# Patient Record
Sex: MALE | NOT HISPANIC OR LATINO | URBAN - METROPOLITAN AREA
[De-identification: names, ages, dates, MRNs, and addresses within clinical notes are randomized per-mention and may not be internally consistent; named-entity substitution may affect disease eponyms.]

---

## 2021-02-12 ENCOUNTER — HOSPITAL ENCOUNTER (OUTPATIENT)
Dept: OTHER | Facility: HOSPITAL | Age: 41
Discharge: HOME | End: 2021-02-12
Attending: NURSE PRACTITIONER

## 2022-04-07 ENCOUNTER — TRANSCRIBE ORDERS (OUTPATIENT)
Dept: ADMINISTRATIVE | Facility: HOSPITAL | Age: 42
End: 2022-04-07

## 2022-04-07 DIAGNOSIS — R14.0 ABDOMINAL BLOATING: Primary | ICD-10-CM

## 2022-04-26 ENCOUNTER — HOSPITAL ENCOUNTER (OUTPATIENT)
Dept: NUCLEAR MEDICINE | Facility: HOSPITAL | Age: 42
Discharge: HOME | End: 2022-04-26

## 2022-04-26 DIAGNOSIS — R14.0 ABDOMINAL BLOATING: ICD-10-CM

## 2022-04-26 PROCEDURE — 0 TECHNETIUM TC 99M MEBROFENIN KIT: Performed by: NURSE PRACTITIONER

## 2022-04-26 PROCEDURE — A9537 TC99M MEBROFENIN: HCPCS | Performed by: NURSE PRACTITIONER

## 2022-04-26 PROCEDURE — 78227 HEPATOBIL SYST IMAGE W/DRUG: CPT

## 2022-04-26 RX ORDER — KIT FOR THE PREPARATION OF TECHNETIUM TC 99M MEBROFENIN 45 MG/10ML
1 INJECTION, POWDER, LYOPHILIZED, FOR SOLUTION INTRAVENOUS
Status: COMPLETED | OUTPATIENT
Start: 2022-04-26 | End: 2022-04-26

## 2022-04-26 RX ADMIN — KIT FOR THE PREPARATION OF TECHNETIUM TC 99M MEBROFENIN 1 DOSE: 45 INJECTION, POWDER, LYOPHILIZED, FOR SOLUTION INTRAVENOUS at 12:25

## 2022-06-12 ENCOUNTER — HOSPITAL ENCOUNTER (EMERGENCY)
Dept: HOSPITAL 49 - FER | Age: 42
Discharge: HOME | End: 2022-06-12
Payer: COMMERCIAL

## 2022-06-12 DIAGNOSIS — R55: Primary | ICD-10-CM

## 2022-06-12 DIAGNOSIS — Z28.310: ICD-10-CM

## 2022-06-12 DIAGNOSIS — Z20.822: ICD-10-CM

## 2022-06-12 DIAGNOSIS — I10: ICD-10-CM

## 2022-06-12 DIAGNOSIS — Z88.6: ICD-10-CM

## 2022-06-12 DIAGNOSIS — R56.9: ICD-10-CM

## 2022-06-12 LAB
ALBUMIN SERPL-MCNC: 3.8 G/DL (ref 3.4–5)
ALKALINE PHOSHATASE: 116 U/L (ref 46–116)
ALT SERPL-CCNC: 181 U/L (ref 16–63)
AMPHETAMINES: NEGATIVE
AST: 77 U/L (ref 15–37)
BARBITURATES: NEGATIVE
BASOPHIL: 0.2 % (ref 0–2)
BILIRUBIN - TOTAL: 0.5 MG/DL (ref 0.2–1)
BILIRUBIN: NEGATIVE MG/DL
BLOOD: NEGATIVE ERY/UL
BUN SERPL-MCNC: 18 MG/DL (ref 7–18)
BUN/CREAT RATIO (CALC): 15 RATIO
CHLORIDE: 101 MMOL/L (ref 98–107)
CLARITY UR: CLEAR
CO2 (BICARBONATE): 23 MMOL/L (ref 21–32)
COLOR: YELLOW
CREATININE: 1.2 MG/DL (ref 0.67–1.17)
ECSTASY (MDMA): NEGATIVE
EOSINOPHIL: 0.7 % (ref 0–5)
GLOBULIN (CALCULATION): 4.6 G/DL
GLUCOSE (U): NORMAL MG/DL
GLUCOSE SERPL-MCNC: 82 MG/DL (ref 74–106)
HCT: 41.6 % (ref 42–52)
HGB BLD-MCNC: 14.1 G/DL (ref 13.2–18)
LACTIC ACID: 2.2 MMOL/L (ref 0.4–1.9)
LEUKOCYTES: NEGATIVE LEU/UL
LYMPHOCYTE: 29.8 % (ref 15–48)
MAGNESIUM SERPL-MCNC: 2 MG/DL (ref 1.8–2.4)
MARIJUANA (THC): NEGATIVE
MCH RBC QN AUTO: 31.3 PG (ref 25–31)
MCHC RBC AUTO-ENTMCNC: 33.9 G/DL (ref 32–36)
MCV: 92.4 FL (ref 78–100)
METHADONE: NEGATIVE
MONOCYTE: 10.5 % (ref 0–12)
MPV: 9.8 FL (ref 6–9.5)
NEUTROPHIL: 58.1 % (ref 41–80)
NITRITE: NEGATIVE MG/DL
NRBC: 0
OPIATES: NEGATIVE
OXYCODONE: NEGATIVE
PLT: 182 K/UL (ref 150–400)
POTASSIUM: 3.4 MMOL/L (ref 3.5–5.1)
PROTEIN: NEGATIVE MG/DL
RBC MORPHOLOGY: NORMAL
RBC: 4.5 M/UL (ref 4.7–6)
RDW: 12.6 % (ref 11.5–14)
SPECIFIC GRAVITY: <=1.005 (ref 1–1.03)
TOTAL PROTEIN: 8.4 G/DL (ref 6.4–8.2)
UROBILINOGEN: 0.2 MG/DL (ref 0.2–1)
WBC: 4.5 K/UL (ref 4–10.5)

## 2022-06-12 PROCEDURE — G0480 DRUG TEST DEF 1-7 CLASSES: HCPCS

## 2022-06-12 PROCEDURE — U0002 COVID-19 LAB TEST NON-CDC: HCPCS

## 2022-08-24 ENCOUNTER — OFFICE VISIT (OUTPATIENT)
Dept: CARDIOLOGY | Facility: CLINIC | Age: 42
End: 2022-08-24

## 2022-08-24 VITALS
SYSTOLIC BLOOD PRESSURE: 128 MMHG | DIASTOLIC BLOOD PRESSURE: 82 MMHG | HEIGHT: 70 IN | HEART RATE: 65 BPM | WEIGHT: 277 LBS | BODY MASS INDEX: 39.65 KG/M2

## 2022-08-24 DIAGNOSIS — R55 SYNCOPE AND COLLAPSE: Primary | ICD-10-CM

## 2022-08-24 PROCEDURE — 99204 OFFICE O/P NEW MOD 45 MIN: CPT | Performed by: INTERNAL MEDICINE

## 2022-08-24 PROCEDURE — 93000 ELECTROCARDIOGRAM COMPLETE: CPT | Performed by: INTERNAL MEDICINE

## 2022-08-24 RX ORDER — NEBIVOLOL 20 MG/1
30 TABLET ORAL DAILY
COMMUNITY
Start: 2022-07-27

## 2022-08-24 RX ORDER — CETIRIZINE HYDROCHLORIDE 10 MG/1
10 TABLET ORAL DAILY
COMMUNITY

## 2022-08-24 RX ORDER — OMEPRAZOLE 40 MG/1
40 CAPSULE, DELAYED RELEASE ORAL DAILY
COMMUNITY
Start: 2022-07-27

## 2022-08-24 RX ORDER — ERGOCALCIFEROL (VITAMIN D2) 10 MCG
400 TABLET ORAL DAILY
COMMUNITY

## 2022-08-24 RX ORDER — ALLOPURINOL 300 MG/1
300 TABLET ORAL
COMMUNITY
Start: 2022-07-27

## 2022-08-24 RX ORDER — AMLODIPINE BESYLATE 10 MG/1
10 TABLET ORAL DAILY
COMMUNITY
Start: 2022-07-27

## 2022-08-24 RX ORDER — DOXAZOSIN 8 MG/1
8 TABLET ORAL DAILY
COMMUNITY
Start: 2022-07-27

## 2022-08-24 NOTE — PROGRESS NOTES
Jordy Leos  1980  Date of Office Visit: 08/24/22  Encounter Provider: Roel Goodman MD  Place of Service: [facility]IOLOG[facility]      CHIEF COMPLAINT:  Syncope  Essential hypertension      HISTORY OF PRESENT ILLNESS:  I had the pleasure of seeing Jordy Leos in consultation today.  He is a 42-year-old male with a medical history of essential hypertension and obesity presents to me with a syncopal episode.  He states that he was outside working in his driveway washing his wife's car 90 degree weather and was very hot.  He did not drink much water that day.  He denies any tachycardia or chest pain prior to his episode.  He went into the garage and had a few beers with his friend.  He subsequently stood up and stated that he lost consciousness falling and regaining consciousness prior to landing.  He stated that he hit his head on the ground and had some bleeding.  He denied any postictal state.  No loss of bowel or bladder control.  He had no tachycardia or chest pain after.  He has had no recurrent episodes.    Review of Systems   Constitutional: Negative for fever and malaise/fatigue.   HENT: Negative for nosebleeds and sore throat.    Eyes: Negative for blurred vision and double vision.   Cardiovascular: Positive for syncope. Negative for chest pain, claudication and palpitations.   Respiratory: Negative for cough, shortness of breath and snoring.    Endocrine: Negative for cold intolerance, heat intolerance and polydipsia.   Skin: Negative for itching, poor wound healing and rash.   Musculoskeletal: Negative for joint pain, joint swelling, muscle weakness and myalgias.   Gastrointestinal: Negative for abdominal pain, melena, nausea and vomiting.   Neurological: Negative for light-headedness, loss of balance, seizures, vertigo and weakness.   Psychiatric/Behavioral: Negative for altered mental status and depression.       Past Medical History:   Diagnosis Date  "  • GERD (gastroesophageal reflux disease)    • Gout    • Hypertension        The following portions of the patient's history were reviewed and updated as appropriate: Social history , Family history and Surgical history     Current Outpatient Medications on File Prior to Visit   Medication Sig Dispense Refill   • allopurinol (ZYLOPRIM) 300 MG tablet Take 300 mg by mouth every night at bedtime.     • amLODIPine (NORVASC) 10 MG tablet Take 10 mg by mouth Daily.     • cetirizine (zyrTEC) 10 MG tablet Take 10 mg by mouth Daily.     • doxazosin (CARDURA) 8 MG tablet Take 8 mg by mouth Daily. as directed     • KRILL OIL PO Take 1 capsule by mouth Daily.     • nebivolol (BYSTOLIC) 20 MG tablet Take 30 mg by mouth Daily.     • omeprazole (priLOSEC) 40 MG capsule Take 40 mg by mouth Daily.     • Probiotic Product ([place] PO) Take 1 capsule by mouth Daily.     • Vitamin D, Cholecalciferol, (CHOLECALCIFEROL) 10 MCG (400 UNIT) tablet Take 400 Units by mouth Daily.       No current facility-administered medications on file prior to visit.       Allergies   Allergen Reactions   • Lisinopril Hives and Swelling   • Aspirin Unknown - Low Severity       Vitals:    08/24/22 1156   BP: 128/82   Pulse: 65   Weight: 126 kg (277 lb)   Height: 177.8 cm (70\")     Body mass index is 39.75 kg/m².   Constitutional:       Appearance: Well-developed.   Eyes:      General: No scleral icterus.     Conjunctiva/sclera: Conjunctivae normal.   HENT:      Head: Normocephalic and atraumatic.   Neck:      Thyroid: No thyromegaly.      Vascular: Normal carotid pulses. No carotid bruit, hepatojugular reflux or JVD.      Trachea: No tracheal deviation.   Pulmonary:      Effort: No respiratory distress.      Breath sounds: Normal breath sounds. No decreased breath sounds. No wheezing. No rhonchi. No rales.   Chest:      Chest wall: Not tender to palpatation.   Cardiovascular:      Normal rate. Regular rhythm.      No gallop.   Pulses:     " Carotid: 2+ bilaterally.     Radial: 2+ bilaterally.     Femoral: 2+ bilaterally.     Dorsalis pedis: 2+ bilaterally.     Posterior tibial: 2+ bilaterally.  Edema:     Peripheral edema absent.   Abdominal:      General: Bowel sounds are normal. There is no distension.      Palpations: Abdomen is soft.      Tenderness: There is no abdominal tenderness.   Musculoskeletal:         General: No deformity.      Cervical back: Normal range of motion and neck supple. Skin:     Findings: No erythema or rash.   Neurological:      Mental Status: Alert and oriented to person, place, and time.      Sensory: No sensory deficit.   Psychiatric:         Behavior: Behavior normal.             ECG 12 Lead    Date/Time: 8/24/2022 12:18 PM  Performed by: Roel Goodman MD  Authorized by: Roel Goodman MD   Comparison: not compared with previous ECG   Previous ECG: no previous ECG available  Rhythm: sinus rhythm  Rate: normal    Clinical impression: non-specific ECG  Comments: Nonspecific T wave abnormalities anterior leads                 DISCUSSION/SUMMARY  42-year-old male with medical history of obesity, essential hypertension, gastroesophageal reflux disease presented to me with a syncopal episode after working outside in his driveway and extreme heat.  He stated that he was not drinking water and that the garage was also hot that he was sitting in.  He had a few beers and lost consciousness.  It sounds like this was a combination of the heat and volume depletion plus minus alcohol.  He has had no recurrent episodes.  He had no chest pain or tachycardia prior to or after.  His cardiac exam is normal.    1.  Syncope: Likely combination of volume depletion and heat.  - Transthoracic echocardiogram has been recommended and he is agreeable.    2.  Essential hypertension: Well-controlled.  Continue amlodipine therapy along with nebivolol.  No changes indicated.    No ischemic work-up indicated.

## 2022-08-26 ENCOUNTER — HOSPITAL ENCOUNTER (OUTPATIENT)
Dept: HOSPITAL 49 - FAS | Age: 42
Discharge: HOME | End: 2022-08-26
Attending: STUDENT IN AN ORGANIZED HEALTH CARE EDUCATION/TRAINING PROGRAM
Payer: COMMERCIAL

## 2022-08-26 VITALS — WEIGHT: 282.24 LBS | BODY MASS INDEX: 38.23 KG/M2 | HEIGHT: 72 IN

## 2022-08-26 DIAGNOSIS — I10: ICD-10-CM

## 2022-08-26 DIAGNOSIS — K64.8: ICD-10-CM

## 2022-08-26 DIAGNOSIS — K21.9: ICD-10-CM

## 2022-08-26 DIAGNOSIS — Z88.8: ICD-10-CM

## 2022-08-26 DIAGNOSIS — D12.2: Primary | ICD-10-CM

## 2022-08-26 DIAGNOSIS — D12.3: ICD-10-CM

## 2022-08-26 DIAGNOSIS — Z87.891: ICD-10-CM

## 2022-08-26 DIAGNOSIS — Z79.899: ICD-10-CM

## 2023-10-20 ENCOUNTER — OFFICE VISIT (OUTPATIENT)
Dept: ORTHOPEDIC SURGERY | Facility: CLINIC | Age: 43
End: 2023-10-20
Payer: COMMERCIAL

## 2023-10-20 VITALS — WEIGHT: 291 LBS | HEIGHT: 70 IN | BODY MASS INDEX: 41.66 KG/M2

## 2023-10-20 DIAGNOSIS — M75.52 BURSITIS OF LEFT SHOULDER: ICD-10-CM

## 2023-10-20 DIAGNOSIS — M25.512 LEFT SHOULDER PAIN, UNSPECIFIED CHRONICITY: Primary | ICD-10-CM

## 2023-10-20 PROCEDURE — 99203 OFFICE O/P NEW LOW 30 MIN: CPT | Performed by: ORTHOPAEDIC SURGERY

## 2023-10-20 PROCEDURE — 20610 DRAIN/INJ JOINT/BURSA W/O US: CPT | Performed by: ORTHOPAEDIC SURGERY

## 2023-10-20 RX ORDER — DICLOFENAC SODIUM 75 MG/1
75 TABLET, DELAYED RELEASE ORAL 2 TIMES DAILY
Qty: 60 TABLET | Refills: 0 | Status: SHIPPED | OUTPATIENT
Start: 2023-10-20

## 2023-10-20 RX ADMIN — TRIAMCINOLONE ACETONIDE 40 MG: 40 INJECTION, SUSPENSION INTRA-ARTICULAR; INTRAMUSCULAR at 16:12

## 2023-10-20 RX ADMIN — LIDOCAINE HYDROCHLORIDE 5 ML: 10 INJECTION, SOLUTION INFILTRATION; PERINEURAL at 16:12

## 2023-10-20 NOTE — PROGRESS NOTES
"Chief Complaint  Initial Evaluation of the Left Shoulder     Mike Leos presents to [facility] for initial evaluation of the left shoulder. He has pain over the AC joint.  He has had pain for 3-4 weeks.  He has had no recent injury or fall. He has been working on cutting a tree down in the yard recently.  He had a IM in and anti inflammatory that helped some.      Allergies   Allergen Reactions    Lisinopril Hives and Swelling    Aspirin Unknown - Low Severity        Social History     Socioeconomic History    Marital status: Single   Tobacco Use    Smoking status: Former     Packs/day: 0.25     Years: 1.00     Additional pack years: 0.00     Total pack years: 0.25     Types: Cigarettes    Smokeless tobacco: Never    Tobacco comments:     Hx of social use   Vaping Use    Vaping Use: Unknown   Substance and Sexual Activity    Alcohol use: Yes     Alcohol/week: 7.0 - 21.0 standard drinks of alcohol     Types: 7 - 21 Cans of beer per week     Comment: 1-3 day    Drug use: Never    Sexual activity: Defer        I reviewed the patient's chief complaint, history of present illness, review of systems, past medical history, surgical history, family history, social history, medications, and allergy list.     Review of Systems     Constitutional: Denies fevers, chills, weight loss  Cardiovascular: Denies chest pain, shortness of breath  Skin: Denies rashes, acute skin changes  Neurologic: Denies headache, loss of consciousness        Vital Signs:   Ht 177.8 cm (70\")   Wt 132 kg (291 lb)   BMI 41.75 kg/m²          Physical Exam  General: Alert. No acute distress    Ortho Exam        LEFT SHOULDER Forward flexion 180. Abduction 180. External rotation 60. Internal rotation L5. Negative Cross body adduction. Supraspinatus strength 5/5. Infraspinatus Strength 5/5. Infrared subscap 5/5. Negative Jackson. Negative Neer. Negative Apprehension. Negative Lift off. (Negative Obriens. " Sensation intact to light touch, median, radial, ulnar nerve. Positive AIN, PIN, ulnar nerve motor. Positive pulses. Negative Impingement signs. Good strength in triceps, biceps, deltoid, wrist extensors and wrist flexors.        Large Joint Arthrocentesis: L subacromial bursa  Date/Time: 10/20/2023 4:12 PM  Consent given by: patient  Site marked: site marked  Timeout: Immediately prior to procedure a time out was called to verify the correct patient, procedure, equipment, support staff and site/side marked as required   Supporting Documentation  Indications: pain   Procedure Details  Location: shoulder - L subacromial bursa  Preparation: Patient was prepped and draped in the usual sterile fashion  Needle size: 22 G  Medications administered: 5 mL lidocaine 1 %; 40 mg triamcinolone acetonide 40 MG/ML  Patient tolerance: patient tolerated the procedure well with no immediate complications          Imaging Results (Most Recent)       Procedure Component Value Units Date/Time    XR Scapula Left [[id number]] Resulted: 10/20/23 1553     Updated: 10/20/23 1555             Result Review :     X-Ray Report:  Left scapula X-Ray  Indication: Evaluation of the left scapula   AP/Lateral and [place] view(s)  Findings: No fracture or dislocation. Mild to moderate AC joint arthritis.   Prior studies available for comparison: no        Assessment and Plan     Diagnoses and all orders for this visit:    1. Left shoulder pain, unspecified chronicity (Primary)  -     XR Scapula Left    2. Bursitis of left shoulder        Discussed the treatment plan with the patient.     Discussed the risks and benefits of conservative measures. The patient expressed understanding and wished to proceed with a left shoulder steroid injection. He tolerated the injection well.       Call or return if worsening symptoms.    Follow Up     PRN      Patient was given instructions and counseling regarding his condition or for health maintenance advice. Please  see specific information pulled into the AVS if appropriate.     Scribed for Reginald Bernal MD by Pauline Maravilla MA.  10/20/23   15:54 EDT      I have personally performed the services described in this document as scribed by the above individual and it is both accurate and complete. Reginald Bernal MD 10/24/23

## 2023-10-24 RX ORDER — LIDOCAINE HYDROCHLORIDE 10 MG/ML
5 INJECTION, SOLUTION INFILTRATION; PERINEURAL
Status: COMPLETED | OUTPATIENT
Start: 2023-10-20 | End: 2023-10-20

## 2023-10-24 RX ORDER — TRIAMCINOLONE ACETONIDE 40 MG/ML
40 INJECTION, SUSPENSION INTRA-ARTICULAR; INTRAMUSCULAR
Status: COMPLETED | OUTPATIENT
Start: 2023-10-20 | End: 2023-10-20

## 2023-12-27 DIAGNOSIS — M25.512 LEFT SHOULDER PAIN, UNSPECIFIED CHRONICITY: ICD-10-CM

## 2023-12-27 DIAGNOSIS — M75.52 BURSITIS OF LEFT SHOULDER: ICD-10-CM

## 2023-12-27 RX ORDER — DICLOFENAC SODIUM 75 MG/1
75 TABLET, DELAYED RELEASE ORAL 2 TIMES DAILY
Qty: 60 TABLET | Refills: 0 | Status: SHIPPED | OUTPATIENT
Start: 2023-12-27

## 2024-01-29 ENCOUNTER — OFFICE VISIT (OUTPATIENT)
Dept: ORTHOPEDIC SURGERY | Facility: CLINIC | Age: 44
End: 2024-01-29
Payer: COMMERCIAL

## 2024-01-29 VITALS
DIASTOLIC BLOOD PRESSURE: 91 MMHG | WEIGHT: 291 LBS | OXYGEN SATURATION: 97 % | HEART RATE: 68 BPM | HEIGHT: 70 IN | SYSTOLIC BLOOD PRESSURE: 143 MMHG | BODY MASS INDEX: 41.66 KG/M2

## 2024-01-29 DIAGNOSIS — M25.512 LEFT SHOULDER PAIN, UNSPECIFIED CHRONICITY: ICD-10-CM

## 2024-01-29 DIAGNOSIS — M75.52 BURSITIS OF LEFT SHOULDER: ICD-10-CM

## 2024-01-29 RX ORDER — DICLOFENAC SODIUM 75 MG/1
75 TABLET, DELAYED RELEASE ORAL 2 TIMES DAILY
Qty: 60 TABLET | Refills: 2 | Status: SHIPPED | OUTPATIENT
Start: 2024-01-29

## 2024-01-29 RX ADMIN — TRIAMCINOLONE ACETONIDE 40 MG: 40 INJECTION, SUSPENSION INTRA-ARTICULAR; INTRAMUSCULAR at 15:39

## 2024-01-29 RX ADMIN — LIDOCAINE HYDROCHLORIDE 5 ML: 10 INJECTION, SOLUTION INFILTRATION; PERINEURAL at 15:39

## 2024-01-29 NOTE — LETTER
February 4, 2024     Patient: Jordy Leos   YOB: 1980   Date of Visit: 1/29/2024       To Whom it May Concern:    Jordy Leos was seen in my clinic on 1/29/2024. He {Return to school/sport:68875}.    If you have any questions or concerns, please don't hesitate to call.         Sincerely,          MYLES Anderson        CC:   No Recipients

## 2024-01-29 NOTE — PROGRESS NOTES
"Chief Complaint  Follow-up of the Left Shoulder    Subjective      Jordy Leos presents to [facility] for follow-up of left shoulder pain.  Patient was last seen on 10/20/2023 where he received an injection into the shoulder.  Reports that the shot helped with his pain up until approximately a week ago.  He denies any new injury.  He continues to have pain with overhead motions and abduction.  Reports that he also has a pulling sensation in the neck with certain motions.  He is here today to receive another injection.    Objective   Allergies   Allergen Reactions    Lisinopril Hives and Swelling    Aspirin Unknown - Low Severity       Vital Signs:   /91   Pulse 68   Ht 177.8 cm (70\")   Wt 132 kg (291 lb)   SpO2 97%   BMI 41.75 kg/m²     Please follow-up with PCP regarding blood pressure.    Physical Exam    Constitutional: Awake, alert. Well nourished appearance.    Integumentary: Warm, dry, intact. No obvious rashes.    HENT: Atraumatic, normocephalic.   Respiratory: Non labored respirations .   Cardiovascular: Intact peripheral pulses.    Psychiatric: Normal mood and affect. A&O X3    Ortho Exam  Left shoulder: Active range of motion for shoulder flexion 150 degrees, abduction 90, internal rotation back pocket, external rotation 80 degrees.  Full range of motion of the elbow and the wrist.  Neurovascular intact.  Sensation is intact.    Imaging Results (Most Recent)       None             Large Joint LEFT SHOULDER  Date/Time: 1/29/2024 3:39 PM  Consent given by: patient  Site marked: site marked  Timeout: Immediately prior to procedure a time out was called to verify the correct patient, procedure, equipment, support staff and site/side marked as required   Supporting Documentation  Indications: pain   Procedure Details  Location: shoulder -   Preparation: Patient was prepped and draped in the usual sterile fashion  Needle gauge: 21G.  Medications administered: 5 mL " lidocaine 1 %; 40 mg triamcinolone acetonide 40 MG/ML  Patient tolerance: patient tolerated the procedure well with no immediate complications              Assessment and Plan   Problem List Items Addressed This Visit    None  Visit Diagnoses       Left shoulder pain, unspecified chronicity        Relevant Medications    diclofenac (VOLTAREN) 75 MG EC tablet    Other Relevant Orders    Large Joint LEFT SHOULDER    Bursitis of left shoulder        Relevant Medications    diclofenac (VOLTAREN) 75 MG EC tablet            Follow Up   Return in about 3 months (around 4/29/2024).    Patient is a non-smoker, did not discuss options for smoking cessation.     Social History     Socioeconomic History    Marital status: Single   Tobacco Use    Smoking status: Former     Packs/day: 0.25     Years: 1.00     Additional pack years: 0.00     Total pack years: 0.25     Types: Cigarettes    Smokeless tobacco: Never    Tobacco comments:     Hx of social use   Vaping Use    Vaping Use: Unknown   Substance and Sexual Activity    Alcohol use: Yes     Alcohol/week: 7.0 - 21.0 standard drinks of alcohol     Types: 7 - 21 Cans of beer per week     Comment: 1-3 day    Drug use: Never    Sexual activity: Defer       Patient Instructions   Left shoulder steroid injection administered in office today and tolerated the procedure well without complications.  Patient advised on 3 months duration between injections.  Discussed that if patient is diabetic to closely monitor blood glucose levels over the next 24 to 48 hours.    Order sent for diclofenac.    Follow-up in 3 months for repeat injection if needed.  Call with questions, concerns or worsening symptoms.    Patient was given instructions and counseling regarding his condition or for health maintenance advice. Please see specific information pulled into the AVS if appropriate.

## 2024-02-04 RX ORDER — TRIAMCINOLONE ACETONIDE 40 MG/ML
40 INJECTION, SUSPENSION INTRA-ARTICULAR; INTRAMUSCULAR
Status: COMPLETED | OUTPATIENT
Start: 2024-01-29 | End: 2024-01-29

## 2024-02-04 RX ORDER — LIDOCAINE HYDROCHLORIDE 10 MG/ML
5 INJECTION, SOLUTION INFILTRATION; PERINEURAL
Status: COMPLETED | OUTPATIENT
Start: 2024-01-29 | End: 2024-01-29

## 2024-02-05 NOTE — PATIENT INSTRUCTIONS
Left shoulder steroid injection administered in office today and tolerated the procedure well without complications.  Patient advised on 3 months duration between injections.  Discussed that if patient is diabetic to closely monitor blood glucose levels over the next 24 to 48 hours.    Order sent for diclofenac.    Follow-up in 3 months for repeat injection if needed.  Call with questions, concerns or worsening symptoms.

## 2024-05-06 ENCOUNTER — OFFICE VISIT (OUTPATIENT)
Dept: ORTHOPEDIC SURGERY | Facility: CLINIC | Age: 44
End: 2024-05-06
Payer: COMMERCIAL

## 2024-05-06 VITALS — HEIGHT: 70 IN | WEIGHT: 291 LBS | BODY MASS INDEX: 41.66 KG/M2

## 2024-05-06 DIAGNOSIS — G89.29 CHRONIC LEFT SHOULDER PAIN: Primary | ICD-10-CM

## 2024-05-06 DIAGNOSIS — M25.512 CHRONIC LEFT SHOULDER PAIN: Primary | ICD-10-CM

## 2024-05-06 PROCEDURE — 20610 DRAIN/INJ JOINT/BURSA W/O US: CPT

## 2024-05-06 RX ORDER — IBUPROFEN 800 MG/1
800 TABLET ORAL 3 TIMES DAILY
Qty: 90 TABLET | Refills: 0 | Status: SHIPPED | OUTPATIENT
Start: 2024-05-06

## 2024-05-06 RX ORDER — LIDOCAINE HYDROCHLORIDE 10 MG/ML
5 INJECTION, SOLUTION INFILTRATION; PERINEURAL
Status: COMPLETED | OUTPATIENT
Start: 2024-05-06 | End: 2024-05-06

## 2024-05-06 RX ORDER — TRIAMCINOLONE ACETONIDE 40 MG/ML
40 INJECTION, SUSPENSION INTRA-ARTICULAR; INTRAMUSCULAR
Status: COMPLETED | OUTPATIENT
Start: 2024-05-06 | End: 2024-05-06

## 2024-05-06 RX ADMIN — TRIAMCINOLONE ACETONIDE 40 MG: 40 INJECTION, SUSPENSION INTRA-ARTICULAR; INTRAMUSCULAR at 15:40

## 2024-05-06 RX ADMIN — LIDOCAINE HYDROCHLORIDE 5 ML: 10 INJECTION, SOLUTION INFILTRATION; PERINEURAL at 15:40

## 2024-08-07 ENCOUNTER — OFFICE VISIT (OUTPATIENT)
Dept: ORTHOPEDIC SURGERY | Facility: CLINIC | Age: 44
End: 2024-08-07
Payer: COMMERCIAL

## 2024-08-07 VITALS
HEART RATE: 74 BPM | DIASTOLIC BLOOD PRESSURE: 96 MMHG | WEIGHT: 286 LBS | OXYGEN SATURATION: 96 % | HEIGHT: 70 IN | BODY MASS INDEX: 40.94 KG/M2 | SYSTOLIC BLOOD PRESSURE: 159 MMHG

## 2024-08-07 DIAGNOSIS — M25.512 CHRONIC LEFT SHOULDER PAIN: Primary | ICD-10-CM

## 2024-08-07 DIAGNOSIS — M25.512 LEFT SHOULDER PAIN, UNSPECIFIED CHRONICITY: ICD-10-CM

## 2024-08-07 DIAGNOSIS — G89.29 CHRONIC LEFT SHOULDER PAIN: Primary | ICD-10-CM

## 2024-08-07 DIAGNOSIS — M75.52 BURSITIS OF LEFT SHOULDER: ICD-10-CM

## 2024-08-07 PROCEDURE — 20610 DRAIN/INJ JOINT/BURSA W/O US: CPT

## 2024-08-07 RX ORDER — LIDOCAINE HYDROCHLORIDE 10 MG/ML
5 INJECTION, SOLUTION INFILTRATION; PERINEURAL
Status: COMPLETED | OUTPATIENT
Start: 2024-08-07 | End: 2024-08-07

## 2024-08-07 RX ORDER — TRIAMCINOLONE ACETONIDE 40 MG/ML
40 INJECTION, SUSPENSION INTRA-ARTICULAR; INTRAMUSCULAR
Status: COMPLETED | OUTPATIENT
Start: 2024-08-07 | End: 2024-08-07

## 2024-08-07 RX ORDER — IBUPROFEN 800 MG/1
800 TABLET ORAL 3 TIMES DAILY
Qty: 90 TABLET | Refills: 0 | Status: SHIPPED | OUTPATIENT
Start: 2024-08-07

## 2024-08-07 RX ADMIN — TRIAMCINOLONE ACETONIDE 40 MG: 40 INJECTION, SUSPENSION INTRA-ARTICULAR; INTRAMUSCULAR at 15:41

## 2024-08-07 RX ADMIN — LIDOCAINE HYDROCHLORIDE 5 ML: 10 INJECTION, SOLUTION INFILTRATION; PERINEURAL at 15:41

## 2024-08-07 NOTE — PROGRESS NOTES
"Chief Complaint  Follow-up of the Left Shoulder    Subjective      Jordy Leos presents to [facility] for follow up of his left shoulder.  Patient has left shoulder pain that he has been treating conservatively with intermittent injections.  Patient was last seen in office on 5/6/2024 and received a left shoulder steroid injection.  Patient denies any injuries or falls since his last office visit.  Patient like to repeat left shoulder steroid injection at this time.    Allergies   Allergen Reactions    Lisinopril Hives and Swelling    Aspirin Unknown - Low Severity       Objective     Vital Signs:   Vitals:    08/07/24 1501   BP: 159/96   Pulse: 74   SpO2: 96%   Weight: 130 kg (286 lb)   Height: 177.8 cm (70\")     Body mass index is 41.04 kg/m².    I reviewed the patient's chief complaint, history of present illness, review of systems, past medical history, surgical history, family history, social history, medications, and allergy list.     REVIEW OF SYSTEMS    Constitutional: Denies fevers, chills, weight loss  Cardiovascular: Denies chest pain, shortness of breath  Skin: Denies rashes, acute skin changes  Neurologic: Denies headache, loss of consciousness  MSK: Left shoulder pain.     Ortho Exam  Shoulder   General: Alert. No acute distress.  Left upper Extremity: 180 degrees active elevation. External rotation to 50 degrees. Internal rotation to mid thoracic.  5/5 resisted shoulder abduction.  4/5 supraspinatus muscle testing with pain.  5/5 subscapularis muscle testing.  Demonstrates intact active elbow ROM. Demonstrates intact active wrist ROM. Sensation intact. Palpable radial pulse. Neurovascularly intact.      Large Joint: L subacromial bursa  Date/Time: 8/7/2024 3:41 PM  Consent given by: patient  Site marked: site marked  Timeout: Immediately prior to procedure a time out was called to verify the correct patient, procedure, equipment, support staff and site/side marked as " required   Supporting Documentation  Indications: pain   Procedure Details  Location: shoulder - L subacromial bursa  Preparation: Patient was prepped and draped in the usual sterile fashion  Needle gauge: 21g.  Medications administered: 5 mL lidocaine 1 %; 40 mg triamcinolone acetonide 40 MG/ML  Patient tolerance: patient tolerated the procedure well with no immediate complications      This injection documentation was Scribed for MYLES Cortez by Leticia Azar MA.  08/07/24   15:42 EDT       Imaging Results (Most Recent)       None                Assessment and Plan   Diagnoses and all orders for this visit:    1. Chronic left shoulder pain (Primary)  -     ibuprofen (ADVIL,MOTRIN) 800 MG tablet; Take 1 tablet by mouth 3 (Three) Times a Day.  Dispense: 90 tablet; Refill: 0    2. Left shoulder pain, unspecified chronicity    3. Bursitis of left shoulder  -     ibuprofen (ADVIL,MOTRIN) 800 MG tablet; Take 1 tablet by mouth 3 (Three) Times a Day.  Dispense: 90 tablet; Refill: 0    Other orders  -     Large Joint: L subacromial bursa         Jordy Leos presents today to [facility] for the follow up of their left shoulder. They have left shoulder pain osteoarthritis that we have been treating conservatively with injections.     We discussed the risks and benefits with the patient regarding left shoulder steroid injection. Patient understood and elected to proceed.  Left shoulder steroid injection given in office today. Patient tolerated injection well with no complications.     Patient is eligible for repeat steroid injection in 3 months.         Follow Up   Return in about 3 months (around 11/7/2024).  There are no Patient Instructions on file for this visit.  Patient was given instructions and counseling regarding his condition or for health maintenance advice. Please see specific information pulled into the AVS if appropriate.       Dictated Utilizing Dragon Dictation. Please note that portions  of this note were completed with a voice recognition program. Part of this note may be an electronic transcription/translation of spoken language to printed text using the Dragon Dictation System.

## 2024-11-11 ENCOUNTER — OFFICE VISIT (OUTPATIENT)
Dept: ORTHOPEDIC SURGERY | Facility: CLINIC | Age: 44
End: 2024-11-11
Payer: COMMERCIAL

## 2024-11-11 VITALS
DIASTOLIC BLOOD PRESSURE: 90 MMHG | BODY MASS INDEX: 41.28 KG/M2 | HEIGHT: 70 IN | WEIGHT: 288.36 LBS | SYSTOLIC BLOOD PRESSURE: 144 MMHG | HEART RATE: 88 BPM | OXYGEN SATURATION: 97 %

## 2024-11-11 DIAGNOSIS — G89.29 CHRONIC LEFT SHOULDER PAIN: Primary | ICD-10-CM

## 2024-11-11 DIAGNOSIS — M75.52 BURSITIS OF LEFT SHOULDER: ICD-10-CM

## 2024-11-11 DIAGNOSIS — M25.512 CHRONIC LEFT SHOULDER PAIN: Primary | ICD-10-CM

## 2024-11-11 RX ORDER — LIDOCAINE HYDROCHLORIDE 10 MG/ML
5 INJECTION, SOLUTION INFILTRATION; PERINEURAL
Status: COMPLETED | OUTPATIENT
Start: 2024-11-11 | End: 2024-11-11

## 2024-11-11 RX ORDER — TRIAMCINOLONE ACETONIDE 40 MG/ML
40 INJECTION, SUSPENSION INTRA-ARTICULAR; INTRAMUSCULAR
Status: COMPLETED | OUTPATIENT
Start: 2024-11-11 | End: 2024-11-11

## 2024-11-11 RX ADMIN — LIDOCAINE HYDROCHLORIDE 5 ML: 10 INJECTION, SOLUTION INFILTRATION; PERINEURAL at 15:50

## 2024-11-11 RX ADMIN — TRIAMCINOLONE ACETONIDE 40 MG: 40 INJECTION, SUSPENSION INTRA-ARTICULAR; INTRAMUSCULAR at 15:50

## 2024-11-11 NOTE — PROGRESS NOTES
"Chief Complaint  Pain and Follow-up of the Left Shoulder    Subjective      Jordy Leos presents to [facility] for follow up of his left shoulder.  Patient has left shoulder pain that he has been treating conservatively with intermittent injections.  Patient states he got significant amount of relief from the previous steroid injection.  He states his shoulder is doing \"a lot better\".  He states that he does not have as much popping or pain along the top of his shoulder.    Allergies   Allergen Reactions    Lisinopril Hives and Swelling    Aspirin Unknown - Low Severity       Objective     Vital Signs:   Vitals:    11/11/24 1549   BP: 144/90   Pulse: 88   SpO2: 97%   Weight: 131 kg (288 lb 5.8 oz)   Height: 177.8 cm (70\")     Body mass index is 41.38 kg/m².    I reviewed the patient's chief complaint, history of present illness, review of systems, past medical history, surgical history, family history, social history, medications, and allergy list.     REVIEW OF SYSTEMS    Constitutional: Denies fevers, chills, weight loss  Cardiovascular: Denies chest pain, shortness of breath  Skin: Denies rashes, acute skin changes  Neurologic: Denies headache, loss of consciousness  MSK: Left shoulder pain.     Ortho Exam  Shoulder   General: Alert. No acute distress.  Left upper Extremity: 180 degrees active elevation. External rotation to 65 degrees. Internal rotation to mid thoracic.   Demonstrates intact active elbow ROM. Demonstrates intact active wrist ROM. Sensation intact. Palpable radial pulse. Neurovascularly intact.      Large Joint Arthrocentesis: L subacromial bursa  Date/Time: 11/11/2024 3:50 PM  Consent given by: patient  Site marked: site marked  Timeout: Immediately prior to procedure a time out was called to verify the correct patient, procedure, equipment, support staff and site/side marked as required   Supporting Documentation  Indications: pain   Procedure " Details  Location: shoulder - L subacromial bursa  Preparation: Patient was prepped and draped in the usual sterile fashion  Needle gauge: 21 G.  Approach: posterior  Medications administered: 5 mL lidocaine 1 %; 40 mg triamcinolone acetonide 40 MG/ML  Patient tolerance: patient tolerated the procedure well with no immediate complications       This injection documentation was Scribed for MYLES Cortez by Teresa Tello.  11/11/24   15:50 EST      Imaging Results (Most Recent)       None                Assessment and Plan   Diagnoses and all orders for this visit:    1. Chronic left shoulder pain (Primary)    2. Bursitis of left shoulder    Other orders  -     Large Joint Arthrocentesis: L subacromial bursa         Jordy Leos presents today to [facility] for the follow up of their left shoulder. They have left shoulder pain and bursitis that we have been treating conservatively with intermittent injections.     We discussed the risks, benefits and alternatives of injections. Patient was informed of possible adverse effects including but not limited to bleeding, damage to nerve, tendon or artery, increased blood sugar and increased blood pressure. Discussed possibility of a reaction from the injection.  Discussed the possibility that the injection may not completely improve or remove the pain.  Discussed the risk of infection.  Discussed the possibility of worsening pain after the injection.  Informed consent obtained.  Time out was performed.  Chlorhexidine was swabbed at injection site per typical technique. Needle injected into bursa, aspiration was performed and then left shoulder steroid slowly injected into joint space, fluid was free flowing. Needle was removed, band-aid placed to injection site.  Patient tolerated injection well with no complications.     Follow up in 3 months for repeat left shoulder steroid injection if needed        Follow Up   Return in about 3 months (around  2/11/2025).  There are no Patient Instructions on file for this visit.  Patient was given instructions and counseling regarding his condition or for health maintenance advice. Please see specific information pulled into the AVS if appropriate.       Dictated Utilizing Dragon Dictation. Please note that portions of this note were completed with a voice recognition program. Part of this note may be an electronic transcription/translation of spoken language to printed text using the Dragon Dictation System.

## 2025-02-24 ENCOUNTER — OFFICE VISIT (OUTPATIENT)
Dept: ORTHOPEDIC SURGERY | Facility: CLINIC | Age: 45
End: 2025-02-24
Payer: COMMERCIAL

## 2025-02-24 VITALS
HEART RATE: 81 BPM | SYSTOLIC BLOOD PRESSURE: 143 MMHG | HEIGHT: 70 IN | WEIGHT: 288 LBS | DIASTOLIC BLOOD PRESSURE: 93 MMHG | BODY MASS INDEX: 41.23 KG/M2 | OXYGEN SATURATION: 96 %

## 2025-02-24 DIAGNOSIS — M75.52 BURSITIS OF LEFT SHOULDER: ICD-10-CM

## 2025-02-24 DIAGNOSIS — M25.512 CHRONIC LEFT SHOULDER PAIN: ICD-10-CM

## 2025-02-24 DIAGNOSIS — G89.29 CHRONIC LEFT SHOULDER PAIN: ICD-10-CM

## 2025-02-24 PROCEDURE — 20610 DRAIN/INJ JOINT/BURSA W/O US: CPT

## 2025-02-24 RX ORDER — IBUPROFEN 800 MG/1
800 TABLET, FILM COATED ORAL 3 TIMES DAILY
Qty: 90 TABLET | Refills: 0 | Status: SHIPPED | OUTPATIENT
Start: 2025-02-24

## 2025-02-24 RX ORDER — LIDOCAINE HYDROCHLORIDE 10 MG/ML
5 INJECTION, SOLUTION INFILTRATION; PERINEURAL
Status: COMPLETED | OUTPATIENT
Start: 2025-02-24 | End: 2025-02-24

## 2025-02-24 RX ORDER — TRIAMCINOLONE ACETONIDE 40 MG/ML
40 INJECTION, SUSPENSION INTRA-ARTICULAR; INTRAMUSCULAR
Status: COMPLETED | OUTPATIENT
Start: 2025-02-24 | End: 2025-02-24

## 2025-02-24 RX ADMIN — TRIAMCINOLONE ACETONIDE 40 MG: 40 INJECTION, SUSPENSION INTRA-ARTICULAR; INTRAMUSCULAR at 16:24

## 2025-02-24 RX ADMIN — LIDOCAINE HYDROCHLORIDE 5 ML: 10 INJECTION, SOLUTION INFILTRATION; PERINEURAL at 16:24

## 2025-02-24 NOTE — PROGRESS NOTES
"Chief Complaint  Follow-up of the Left Shoulder    Subjective      Jordy Leos presents to [facility] for follow up of their left shoulder.  He has left shoulder pain and bursitis that he manages conservatively with intermittent injections.  He was last seen in office on 11/11/2024 and received a left shoulder steroid injection.  Returns to clinic today stating that his injection just recently wore off the last few days.  He denies any falls or injuries.  He would like to repeat left shoulder steroid injection today in office.    Allergies   Allergen Reactions    Lisinopril Hives and Swelling    Aspirin Unknown - Low Severity       Objective     Vital Signs:   Vitals:    02/24/25 1611   BP: 143/93   Pulse: 81   SpO2: 96%   Weight: 131 kg (288 lb)   Height: 177.8 cm (70\")     Body mass index is 41.32 kg/m².    I reviewed the patient's chief complaint, history of present illness, review of systems, past medical history, surgical history, family history, social history, medications, and allergy list.     Ortho Exam  Shoulder   General: Alert. No acute distress.  Left upper Extremity:  tender to palpation over AC joint. No skin discoloration, atrophy, or swelling. 170 degrees active elevation with pain. External rotation to 65 degrees. Internal rotation to back pocket.   Demonstrates intact active elbow ROM. Demonstrates intact active wrist ROM. Sensation intact. Palpable radial pulse. Neurovascularly intact.      Large Joint: L subacromial bursa  Date/Time: 2/24/2025 4:24 PM  Consent given by: patient  Site marked: site marked  Timeout: Immediately prior to procedure a time out was called to verify the correct patient, procedure, equipment, support staff and site/side marked as required   Supporting Documentation  Indications: pain   Procedure Details  Location: shoulder - L subacromial bursa  Preparation: Patient was prepped and draped in the usual sterile fashion  Needle gauge: 21 " G.  Medications administered: 40 mg triamcinolone acetonide 40 MG/ML; 5 mL lidocaine 1 %  Patient tolerance: patient tolerated the procedure well with no immediate complications       This injection documentation was Scribed for MYLES Cortez by Sierra Schaffer MA.  02/24/25   16:25 EST     Imaging Results (Most Recent)       None                Assessment and Plan   Diagnoses and all orders for this visit:    1. Chronic left shoulder pain  -     ibuprofen (ADVIL,MOTRIN) 800 MG tablet; Take 1 tablet by mouth 3 (Three) Times a Day.  Dispense: 90 tablet; Refill: 0    2. Bursitis of left shoulder  -     ibuprofen (ADVIL,MOTRIN) 800 MG tablet; Take 1 tablet by mouth 3 (Three) Times a Day.  Dispense: 90 tablet; Refill: 0    Other orders  -     Large Joint: L subacromial bursa         Jordy Leos presents today to [facility] for the follow up of their left shoulder. They have left shoulder pain and bursitis that we have been treating conservatively with intermittent injections.  His last steroid injection was on 11/11/2024.  He request to repeat left shoulder steroid injection today in office.  He also reports he would like a refill of his ibuprofen as he only takes intermittently.    We discussed the risks, benefits and alternatives of injections. Patient was informed of possible adverse effects including but not limited to bleeding, damage to nerve, tendon or artery, increased blood sugar and increased blood pressure. Discussed possibility of a reaction from the injection.  Discussed the possibility that the injection may not completely improve or remove the pain.  Discussed the risk of infection.  Discussed the possibility of worsening pain after the injection.  Informed consent obtained.  Time out was performed. Patient was placed with knee in flexion at 90 degrees. Site marked inferior and lateral to patella.  Chlorhexidine was swabbed at injection site per typical technique. Needle injected into  bursa, aspiration was performed and then left shoulder pain slowly injected into joint space, fluid was free flowing. Needle was removed, band-aid placed to injection site.  Patient tolerated injection well with no complications.     Follow up as needed.  Eligible for repeat left shoulder steroid injection on or after May 25.          Tobacco Use: Medium Risk (2/24/2025)    Patient History     Smoking Tobacco Use: Former     Smokeless Tobacco Use: Never     Passive Exposure: Not on file     Patient reports they have a history of tobacco use; encouraged continued tobacco cessation for further health benefits.           Follow Up   Return in about 3 months (around 5/24/2025).  There are no Patient Instructions on file for this visit.    Patient was given instructions and counseling regarding his condition or for health maintenance advice. Please see specific information pulled into the AVS if appropriate.     Dictated Utilizing Dragon Dictation. Please note that portions of this note were completed with a voice recognition program. Part of this note may be an electronic transcription/translation of spoken language to printed text using the Dragon Dictation System.

## 2025-04-21 DIAGNOSIS — M75.52 BURSITIS OF LEFT SHOULDER: ICD-10-CM

## 2025-04-21 DIAGNOSIS — G89.29 CHRONIC LEFT SHOULDER PAIN: ICD-10-CM

## 2025-04-21 DIAGNOSIS — M25.512 CHRONIC LEFT SHOULDER PAIN: ICD-10-CM

## 2025-04-21 RX ORDER — IBUPROFEN 800 MG/1
800 TABLET, FILM COATED ORAL 3 TIMES DAILY
Qty: 90 TABLET | Refills: 0 | Status: SHIPPED | OUTPATIENT
Start: 2025-04-21

## 2025-05-20 DIAGNOSIS — M25.512 CHRONIC LEFT SHOULDER PAIN: ICD-10-CM

## 2025-05-20 DIAGNOSIS — M75.52 BURSITIS OF LEFT SHOULDER: ICD-10-CM

## 2025-05-20 DIAGNOSIS — G89.29 CHRONIC LEFT SHOULDER PAIN: ICD-10-CM

## 2025-05-22 RX ORDER — IBUPROFEN 800 MG/1
800 TABLET, FILM COATED ORAL 3 TIMES DAILY
Qty: 90 TABLET | Refills: 0 | OUTPATIENT
Start: 2025-05-22

## 2025-06-02 ENCOUNTER — OFFICE VISIT (OUTPATIENT)
Dept: ORTHOPEDIC SURGERY | Facility: CLINIC | Age: 45
End: 2025-06-02
Payer: COMMERCIAL

## 2025-06-02 VITALS
BODY MASS INDEX: 41.23 KG/M2 | WEIGHT: 288 LBS | HEART RATE: 76 BPM | SYSTOLIC BLOOD PRESSURE: 150 MMHG | HEIGHT: 70 IN | DIASTOLIC BLOOD PRESSURE: 88 MMHG | OXYGEN SATURATION: 98 %

## 2025-06-02 DIAGNOSIS — M25.512 CHRONIC LEFT SHOULDER PAIN: ICD-10-CM

## 2025-06-02 DIAGNOSIS — G89.29 CHRONIC LEFT SHOULDER PAIN: ICD-10-CM

## 2025-06-02 DIAGNOSIS — M75.52 BURSITIS OF LEFT SHOULDER: Primary | ICD-10-CM

## 2025-06-02 RX ADMIN — TRIAMCINOLONE ACETONIDE 40 MG: 40 INJECTION, SUSPENSION INTRA-ARTICULAR; INTRAMUSCULAR at 15:20

## 2025-06-02 RX ADMIN — LIDOCAINE HYDROCHLORIDE 5 ML: 10 INJECTION, SOLUTION INFILTRATION; PERINEURAL at 15:20

## 2025-06-02 NOTE — PROGRESS NOTES
"Chief Complaint  Follow-up of the Left Shoulder    Subjective      Jordy Leos presents to [facility]     History of Present Illness  He is here today following up on his left shoulder.  Patient has a history of left shoulder bursitis and chronic left shoulder pain that he manages conservatively with activity modification, anti-inflammatories as needed and intermittent steroid injection.  He last received a left shoulder steroid injection on 2/24/2025.  He returns to clinic without any new complaints of injuries or changes.  He reports mild discomfort in the shoulder began last week.  He is requesting repeat left shoulder steroid injection today in office.      Allergies   Allergen Reactions    Lisinopril Hives and Swelling    Aspirin Unknown - Low Severity       Objective     Vital Signs:   Vitals:    06/02/25 1517   BP: 150/88   Pulse: 76   SpO2: 98%   Weight: 131 kg (288 lb)   Height: 177.8 cm (70\")     Body mass index is 41.32 kg/m².    I reviewed the patient's chief complaint, history of present illness, review of systems, past medical history, surgical history, family history, social history, medications, and allergy list.     Ortho Exam  Shoulder   General: Alert. No acute distress.  Left upper Extremity: not tender to palpation. No skin discoloration, atrophy, or swelling. 170 degrees active elevation. External rotation to 45 degrees. Internal rotation to side pocket. . Demonstrates intact active elbow ROM. Demonstrates intact active wrist ROM. Sensation intact. Palpable radial pulse. Neurovascularly intact.          Large Joint Arthrocentesis: L subacromial bursa  Date/Time: 6/2/2025 3:20 PM  Consent given by: patient  Site marked: site marked  Timeout: Immediately prior to procedure a time out was called to verify the correct patient, procedure, equipment, support staff and site/side marked as required   Supporting Documentation  Indications: pain   Procedure " Details  Location: shoulder - L subacromial bursa  Preparation: Patient was prepped and draped in the usual sterile fashion  Needle gauge: 21 G.  Medications administered: 5 mL lidocaine 1 %; 40 mg triamcinolone acetonide 40 MG/ML  Patient tolerance: patient tolerated the procedure well with no immediate complications       This injection documentation was Scribed for MYLES Cortez by GUERDA Pierce.  06/02/25   15:21 EDT     Imaging Results (Most Recent)       None             Results           Assessment and Plan   Diagnoses and all orders for this visit:    1. Bursitis of left shoulder (Primary)    2. Chronic left shoulder pain    Other orders  -     Large Joint Arthrocentesis: L subacromial bursa             Assessment & Plan  1. Shoulder pain:    Patient has been able to manage his symptoms successfully with conservative treatment options.  Patient elected to proceed with repeat left shoulder steroid injections. Patient is previously aware of the risks, benefits and alternatives of injections. Patient was informed of possible adverse effects including but not limited to bleeding, damage to nerve, tendon or artery, increased blood sugar and increased blood pressure. Discussed possibility of a reaction from the injection.  Discussed the possibility that the injection may not completely improve or remove the pain.  Discussed the risk of infection.  Discussed the possibility of worsening pain after the injection.  Informed consent obtained.  Time out was performed. Chlorhexidine was swabbed at injection site per typical technique. Needle injected into bursa, aspiration was performed and then left shoulder steroid slowly injected into joint space, fluid was free flowing. Needle was removed, band-aid placed to injection site.  Patient tolerated injection well with no complications. Additional information was provided on at checkout.     Continue using ibuprofen as needed for pain management. Avoid  activities that may exacerbate the shoulder pain. Consider physical therapy if pain persists or worsens. Schedule a follow-up appointment in 3 months to reassess the condition and adjust the treatment plan as necessary.    Follow-up  Follow up in 3 months.  Notable for repeat left shoulder steroid injection at that time if needed.            Follow Up   Return in about 3 months (around 9/2/2025).  There are no Patient Instructions on file for this visit.    Patient was given instructions and counseling regarding his condition or for health maintenance advice. Please see specific information pulled into the AVS if appropriate.     Patient or patient representative verbalized consent for the use of Ambient Listening during the visit with  MYLES Cortez for chart documentation. 6/3/2025  16:11 EDT    Dictated Utilizing Dragon Dictation. Please note that portions of this note were completed with a voice recognition program. Part of this note may be an electronic transcription/translation of spoken language to printed text using the Dragon Dictation System.

## 2025-06-03 RX ORDER — TRIAMCINOLONE ACETONIDE 40 MG/ML
40 INJECTION, SUSPENSION INTRA-ARTICULAR; INTRAMUSCULAR
Status: COMPLETED | OUTPATIENT
Start: 2025-06-02 | End: 2025-06-02

## 2025-06-03 RX ORDER — LIDOCAINE HYDROCHLORIDE 10 MG/ML
5 INJECTION, SOLUTION INFILTRATION; PERINEURAL
Status: COMPLETED | OUTPATIENT
Start: 2025-06-02 | End: 2025-06-02